# Patient Record
(demographics unavailable — no encounter records)

---

## 2025-07-30 NOTE — PHYSICAL EXAM
[NL] : moves all extremities x4, warm, well perfused x4 [Lethargic] : not lethargic [Conjuctival Injection] : no conjunctival injection [Eyelid Swelling] : no eyelid swelling [Allergic Shiners] : no allergic shiners [Erythematous Oropharynx] : nonerythematous oropharynx [Enlarged Tonsils] : tonsils not enlarged [de-identified] : blisters on tongue  [de-identified] : clear [de-identified] : no swelling of joints [de-identified] : multiple papular macular rash and vesicles noted on knees, elbows, palms, soles and bren oral

## 2025-07-30 NOTE — DISCUSSION/SUMMARY
[FreeTextEntry1] :  on illness-	coxsackie/ 			 Supportive care- fluids/ soft diet  all at room temperature/ Tylenol or Motrin as needed pain or fever/ ZYRTEC  for itchiness   discuss contagiousness  Return as needed

## 2025-07-30 NOTE — HISTORY OF PRESENT ILLNESS
[Derm Symptoms] : DERM SYMPTOMS [Rash] : rash [Extremities] : extremities [___ Hour(s)] : [unfilled] hour(s) [Constant] : constant [Itchy] : itchy [Spreading] : spreading [Pruritus] : pruritus [New Food] : no new food [New Skin Products] : no new skin products [Recent Travel] : no recent travel [Fever] : no fever [Sore Throat] : no sore throat [Vomiting] : no vomiting [Discharge from affected areas] : no discharge from affected areas [Diarrhea] : no diarrhea [Bleeding from affected areas] : no bleeding from affected areas [FreeTextEntry9] : in mouth  [FreeTextEntry1] : start onelbow and knees  then mouth  [FreeTextEntry3] : in summer camp- on 7/24 [place] [employer]  [FreeTextEntry5] : decrease appetite